# Patient Record
Sex: FEMALE | Race: WHITE | NOT HISPANIC OR LATINO | Employment: FULL TIME | URBAN - METROPOLITAN AREA
[De-identification: names, ages, dates, MRNs, and addresses within clinical notes are randomized per-mention and may not be internally consistent; named-entity substitution may affect disease eponyms.]

---

## 2018-06-29 ENCOUNTER — OFFICE VISIT (OUTPATIENT)
Dept: GASTROENTEROLOGY | Facility: CLINIC | Age: 43
End: 2018-06-29

## 2018-06-29 ENCOUNTER — LAB (OUTPATIENT)
Dept: LAB | Facility: HOSPITAL | Age: 43
End: 2018-06-29

## 2018-06-29 VITALS
HEART RATE: 71 BPM | OXYGEN SATURATION: 98 % | TEMPERATURE: 98.3 F | HEIGHT: 63 IN | SYSTOLIC BLOOD PRESSURE: 120 MMHG | BODY MASS INDEX: 23.18 KG/M2 | WEIGHT: 130.8 LBS | DIASTOLIC BLOOD PRESSURE: 80 MMHG

## 2018-06-29 DIAGNOSIS — R79.89 LFT ELEVATION: Primary | ICD-10-CM

## 2018-06-29 DIAGNOSIS — R79.89 LFT ELEVATION: ICD-10-CM

## 2018-06-29 LAB
ALBUMIN SERPL-MCNC: 4.53 G/DL (ref 3.2–4.8)
ALBUMIN/GLOB SERPL: 1.5 G/DL (ref 1.5–2.5)
ALP SERPL-CCNC: 82 U/L (ref 25–100)
ALT SERPL W P-5'-P-CCNC: 24 U/L (ref 7–40)
ANION GAP SERPL CALCULATED.3IONS-SCNC: 6 MMOL/L (ref 3–11)
AST SERPL-CCNC: 26 U/L (ref 0–33)
BILIRUB SERPL-MCNC: 0.4 MG/DL (ref 0.3–1.2)
BUN BLD-MCNC: 8 MG/DL (ref 9–23)
BUN/CREAT SERPL: 12.9 (ref 7–25)
CALCIUM SPEC-SCNC: 9.3 MG/DL (ref 8.7–10.4)
CHLORIDE SERPL-SCNC: 105 MMOL/L (ref 99–109)
CO2 SERPL-SCNC: 28 MMOL/L (ref 20–31)
CREAT BLD-MCNC: 0.62 MG/DL (ref 0.6–1.3)
FERRITIN SERPL-MCNC: 58 NG/ML (ref 10–291)
GFR SERPL CREATININE-BSD FRML MDRD: 106 ML/MIN/1.73
GLOBULIN UR ELPH-MCNC: 3.1 GM/DL
GLUCOSE BLD-MCNC: 93 MG/DL (ref 70–100)
HBV SURFACE AB SER RIA-ACNC: NORMAL
IRON 24H UR-MRATE: 72 MCG/DL (ref 50–175)
IRON SATN MFR SERPL: 21 % (ref 15–50)
POTASSIUM BLD-SCNC: 3.8 MMOL/L (ref 3.5–5.5)
PROT SERPL-MCNC: 7.6 G/DL (ref 5.7–8.2)
SODIUM BLD-SCNC: 139 MMOL/L (ref 132–146)
TIBC SERPL-MCNC: 341 MCG/DL (ref 250–450)

## 2018-06-29 PROCEDURE — 83516 IMMUNOASSAY NONANTIBODY: CPT

## 2018-06-29 PROCEDURE — 86376 MICROSOMAL ANTIBODY EACH: CPT

## 2018-06-29 PROCEDURE — 82728 ASSAY OF FERRITIN: CPT

## 2018-06-29 PROCEDURE — 36415 COLL VENOUS BLD VENIPUNCTURE: CPT

## 2018-06-29 PROCEDURE — 86706 HEP B SURFACE ANTIBODY: CPT

## 2018-06-29 PROCEDURE — 86255 FLUORESCENT ANTIBODY SCREEN: CPT

## 2018-06-29 PROCEDURE — 82784 ASSAY IGA/IGD/IGG/IGM EACH: CPT

## 2018-06-29 PROCEDURE — 83540 ASSAY OF IRON: CPT

## 2018-06-29 PROCEDURE — 99244 OFF/OP CNSLTJ NEW/EST MOD 40: CPT | Performed by: NURSE PRACTITIONER

## 2018-06-29 PROCEDURE — 86708 HEPATITIS A ANTIBODY: CPT

## 2018-06-29 PROCEDURE — 86038 ANTINUCLEAR ANTIBODIES: CPT

## 2018-06-29 PROCEDURE — 83550 IRON BINDING TEST: CPT

## 2018-06-29 PROCEDURE — 80053 COMPREHEN METABOLIC PANEL: CPT

## 2018-06-29 RX ORDER — CITALOPRAM 20 MG/1
20 TABLET ORAL DAILY
COMMUNITY
Start: 2018-04-30

## 2018-06-30 LAB
IGA SERPL-MCNC: <5 MG/DL (ref 87–352)
IGG SERPL-MCNC: 1688 MG/DL (ref 700–1600)
IGM SERPL-MCNC: 127 MG/DL (ref 26–217)

## 2018-07-01 LAB — HAV AB SER QL IA: NEGATIVE

## 2018-07-02 LAB
ACTIN IGG SERPL-ACNC: 20 UNITS (ref 0–19)
ALP LIVER CFR SERPL: 1.2 UNITS (ref 0–20)
ANA SER QL IA: POSITIVE
DEPRECATED MITOCHONDRIA M2 IGG SER-ACNC: <20 UNITS (ref 0–20)
ENDOMYSIUM IGA SER QL: NEGATIVE
IGA SERPL-MCNC: <5 MG/DL (ref 87–352)
Lab: ABNORMAL
NUCLEAR MEMBRANE PATTERN: ABNORMAL
TTG IGA SER-ACNC: <2 U/ML (ref 0–3)
TTG IGG SER-ACNC: >100 U/ML (ref 0–5)

## 2018-07-03 LAB — SOLUBLE LIVER IGG SER IA-ACNC: 3.3 UNITS (ref 0–20)

## 2018-07-06 ENCOUNTER — HOSPITAL ENCOUNTER (OUTPATIENT)
Dept: ULTRASOUND IMAGING | Facility: HOSPITAL | Age: 43
Discharge: HOME | End: 2018-07-06
Admitting: NURSE PRACTITIONER

## 2018-07-06 DIAGNOSIS — R79.89 LFT ELEVATION: ICD-10-CM

## 2018-07-06 PROCEDURE — 76705 ECHO EXAM OF ABDOMEN: CPT

## 2018-07-11 ENCOUNTER — TELEPHONE (OUTPATIENT)
Dept: GASTROENTEROLOGY | Facility: CLINIC | Age: 43
End: 2018-07-11

## 2018-07-14 NOTE — TELEPHONE ENCOUNTER
Discussed lab results.  I recommend liver bx d/t inconclusive results of liver blood work up.    Pt will think about it and get back with me.        Liver Profile     Lab Results   Component Value Date    AST 26 06/29/2018     Lab Results   Component Value Date    ALT 24 06/29/2018     Lab Results   Component Value Date    ALKPHOS 82 06/29/2018     Lab Results   Component Value Date    BILITOT 0.4 06/29/2018    ALBUMIN 4.53 06/29/2018    PROTEINTOT 7.6 06/29/2018     Albumin Globulin Ratio (Normal 1.5 - 2.5 g/dL)  Lab Results   Component Value Date    LABIL2 1.5 06/29/2018      Alkaline Phosphatase, Isoenzymes   Lab Results   Component Value Date    LABLIVE 1.2 06/29/2018     Immunity against Hep A and B  Lab Results   Component Value Date    HAV Negative 06/29/2018    HEPBSAB Non-Reactive 06/29/2018     TIBC (Normal 250 - 450 ug/dL)  Lab Results   Component Value Date    TIBC 341 06/29/2018     Serum Iron (Normal 38 - 169 ug/dL)  Lab Results   Component Value Date    IRON 72 06/29/2018     Transferrin or Iron Saturation % (Normal 20 - 50 %)  Lab Results   Component Value Date    LABIRON 21 06/29/2018     Ferritin (Normal 20.00 - 291.00 ng/mL)  Lab Results   Component Value Date    FERRITIN 58.00 06/29/2018     Thyroid/Calcium Panel (Normal TSH: 0.350 - 5.350 mIU/mL)  Lab Results   Component Value Date    CALCIUM 9.3 06/29/2018     Autoimmune Markers  Lab Results   Component Value Date    ZAINA Positive (A) 06/29/2018    LABANTI 3.3 06/29/2018     Rheumatoid Factor  No results found for: RALATEXTURBD  Immunoglobulins Panel [IgG (IgG Subclasses), IgA, IgM]  (IgG 700 - 1600 mg/dL. IgA 91 - 414 mg/dL. IgM 40 - 230 mg/dL)  Lab Results   Component Value Date    TOTIGGREF 1688 (H) 06/29/2018    IGA <5 (L) 06/29/2018    IGA <5 (L) 06/29/2018     06/29/2018     Celiac Panel (IgA 91 - 414 mg/dL)  Lab Results   Component Value Date    TTRANSGLIGA <2 06/29/2018    TSUTRANIGG >100 (H) 06/29/2018    [id number] Negative  06/29/2018    IGA <5 (L) 06/29/2018    IGA <5 (L) 06/29/2018     IBD Panel  No results found for: ATYPPANC, SCERVIGG, SCERVIGA, VERNON, ACCA, ALCA, AMCA, MYELOP, PR3ABS, CANCA, PANCA  Anti-Mitochondrial Antibody (AMA)   Lab Results   Component Value Date    MITOAB <20.0 06/29/2018     Anti-Smooth Muscle Antibody   Lab Results   Component Value Date    SMOOTHMUSCAB 20 (H) 06/29/2018     Liver-Kidney Antibody (Anti-microsomal Antibody)  Lab Results   Component Value Date    LABLIVE 1.2 06/29/2018     Alpha-1 Antitrypsin Phenotype and Alpha Tumor Marker  No results found for: AFPTM, PHENOTYPE  Ceruloplasmin (Normal 16.0 - 31.0 mg/dL)  No results found for: CERULOPLSM  Copper  No results found for: COPPER  Soluble Liver Ag (IgG Ab)   Lab Results   Component Value Date    LABANTI 3.3 06/29/2018

## 2018-07-17 ENCOUNTER — TELEPHONE (OUTPATIENT)
Dept: GASTROENTEROLOGY | Facility: CLINIC | Age: 43
End: 2018-07-17

## 2018-07-17 DIAGNOSIS — R74.8 LIVER ENZYME ELEVATION: Primary | ICD-10-CM

## 2018-07-17 DIAGNOSIS — R76.0 ANTINUCLEAR ANTIBODY (ANA) TITER GREATER THAN 1:80: ICD-10-CM

## 2018-08-03 ENCOUNTER — HOSPITAL ENCOUNTER (OUTPATIENT)
Dept: CT IMAGING | Facility: HOSPITAL | Age: 43
Discharge: HOME | End: 2018-08-03
Admitting: NURSE PRACTITIONER

## 2018-08-03 VITALS
HEIGHT: 63 IN | RESPIRATION RATE: 18 BRPM | BODY MASS INDEX: 23.21 KG/M2 | TEMPERATURE: 98.6 F | HEART RATE: 69 BPM | SYSTOLIC BLOOD PRESSURE: 102 MMHG | DIASTOLIC BLOOD PRESSURE: 67 MMHG | OXYGEN SATURATION: 98 % | WEIGHT: 131 LBS

## 2018-08-03 DIAGNOSIS — R76.0 ANTINUCLEAR ANTIBODY (ANA) TITER GREATER THAN 1:80: ICD-10-CM

## 2018-08-03 DIAGNOSIS — R74.8 LIVER ENZYME ELEVATION: ICD-10-CM

## 2018-08-03 LAB
APTT PPP: 25.1 SECONDS (ref 24–31)
B-HCG UR QL: NEGATIVE
INR PPP: 0.94 (ref 0.91–1.09)
PLATELET # BLD AUTO: 185 10*3/MM3 (ref 150–450)
PROTHROMBIN TIME: 9.9 SECONDS (ref 9.6–11.5)

## 2018-08-03 PROCEDURE — 77012 CT SCAN FOR NEEDLE BIOPSY: CPT

## 2018-08-03 PROCEDURE — 85610 PROTHROMBIN TIME: CPT | Performed by: RADIOLOGY

## 2018-08-03 PROCEDURE — 85730 THROMBOPLASTIN TIME PARTIAL: CPT | Performed by: RADIOLOGY

## 2018-08-03 PROCEDURE — 85049 AUTOMATED PLATELET COUNT: CPT | Performed by: RADIOLOGY

## 2018-08-03 PROCEDURE — 88313 SPECIAL STAINS GROUP 2: CPT | Performed by: NURSE PRACTITIONER

## 2018-08-03 PROCEDURE — 81025 URINE PREGNANCY TEST: CPT | Performed by: RADIOLOGY

## 2018-08-03 PROCEDURE — 25010000002 FENTANYL CITRATE (PF) 100 MCG/2ML SOLUTION

## 2018-08-03 PROCEDURE — 88307 TISSUE EXAM BY PATHOLOGIST: CPT | Performed by: NURSE PRACTITIONER

## 2018-08-03 RX ORDER — FENTANYL CITRATE 50 UG/ML
INJECTION, SOLUTION INTRAMUSCULAR; INTRAVENOUS
Status: COMPLETED
Start: 2018-08-03 | End: 2018-08-03

## 2018-08-03 RX ORDER — FENTANYL CITRATE 50 UG/ML
50 INJECTION, SOLUTION INTRAMUSCULAR; INTRAVENOUS
Status: CANCELLED | OUTPATIENT
Start: 2018-08-03

## 2018-08-03 RX ORDER — SODIUM CHLORIDE 0.9 % (FLUSH) 0.9 %
1-10 SYRINGE (ML) INJECTION AS NEEDED
Status: DISCONTINUED | OUTPATIENT
Start: 2018-08-03 | End: 2018-08-04 | Stop reason: HOSPADM

## 2018-08-03 RX ADMIN — FENTANYL CITRATE 50 MCG: 50 INJECTION, SOLUTION INTRAMUSCULAR; INTRAVENOUS at 10:23

## 2018-08-03 NOTE — NURSING NOTE
Pt discharged to home s/p liver biopsy.  Pt tolerated procedure without complications.  Discharge instructions reviewed with patient who voiced her understanding.  Transported to exit via wheelchair per CNA.

## 2018-08-03 NOTE — NURSING NOTE
Procedure complete.  Pt tolerated well.  Report called to [facility].  Pt returned to room via hospital transport.

## 2018-08-03 NOTE — PROCEDURES
Radiology Procedure    Pre-procedure: Elevated LFTs     Procedure Performed: CT-guided liver biopsy     IV Sedation and/or Anesthesia:  No    Complications: None    Preliminary Findings: Homogenous liver parenchyma, No lesion    Specimen Removed: Core biopsy x 3    Estimated Blood Loss:  0ml    Post-Procedure Diagnosis: Elevated LFTs for random liver biopsy    Post-Procedure Plan: Await full pathology report    Standard Discharge Instructions Given:yes     Jimenez Robison DO  08/03/18  12:01 PM

## 2018-08-06 ENCOUNTER — TELEPHONE (OUTPATIENT)
Dept: INFUSION THERAPY | Facility: HOSPITAL | Age: 43
End: 2018-08-06

## 2018-08-06 LAB
CYTO UR: NORMAL
LAB AP CASE REPORT: NORMAL
LAB AP CLINICAL INFORMATION: NORMAL
LAB AP DIAGNOSIS COMMENT: NORMAL
PATH REPORT.FINAL DX SPEC: NORMAL
PATH REPORT.GROSS SPEC: NORMAL

## 2018-08-08 ENCOUNTER — TELEPHONE (OUTPATIENT)
Dept: GASTROENTEROLOGY | Facility: CLINIC | Age: 43
End: 2018-08-08

## 2018-08-09 ENCOUNTER — TELEPHONE (OUTPATIENT)
Dept: GASTROENTEROLOGY | Facility: CLINIC | Age: 43
End: 2018-08-09

## 2018-09-21 ENCOUNTER — OUTSIDE FACILITY SERVICE (OUTPATIENT)
Dept: GASTROENTEROLOGY | Facility: CLINIC | Age: 43
End: 2018-09-21

## 2018-09-21 ENCOUNTER — LAB REQUISITION (OUTPATIENT)
Dept: LAB | Facility: HOSPITAL | Age: 43
End: 2018-09-21

## 2018-09-21 DIAGNOSIS — K90.0 CELIAC DISEASE: ICD-10-CM

## 2018-09-21 DIAGNOSIS — K30 FUNCTIONAL DYSPEPSIA: ICD-10-CM

## 2018-09-21 PROCEDURE — 43239 EGD BIOPSY SINGLE/MULTIPLE: CPT | Performed by: INTERNAL MEDICINE

## 2018-09-21 PROCEDURE — 88305 TISSUE EXAM BY PATHOLOGIST: CPT | Performed by: INTERNAL MEDICINE

## 2018-09-25 ENCOUNTER — TELEPHONE (OUTPATIENT)
Dept: GASTROENTEROLOGY | Facility: CLINIC | Age: 43
End: 2018-09-25

## 2018-09-25 NOTE — TELEPHONE ENCOUNTER
I called and left a message regarding the pathology.  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  I did recommend the patient begin a gluten-free diet.  Also left a message regarding a good website which is the celiac sprue association.

## 2018-09-26 NOTE — TELEPHONE ENCOUNTER
Gave patient your message, she would like to know what the extend of the damage was? she would also like to know if she needs to follow up?

## 2018-09-28 NOTE — TELEPHONE ENCOUNTER
Gave patient the message, she was transferred to [facility] to make 2 month follow up.

## 2018-12-05 ENCOUNTER — OFFICE VISIT (OUTPATIENT)
Dept: GASTROENTEROLOGY | Facility: CLINIC | Age: 43
End: 2018-12-05

## 2018-12-05 ENCOUNTER — APPOINTMENT (OUTPATIENT)
Dept: LAB | Facility: HOSPITAL | Age: 43
End: 2018-12-05

## 2018-12-05 VITALS
SYSTOLIC BLOOD PRESSURE: 110 MMHG | WEIGHT: 134.4 LBS | OXYGEN SATURATION: 97 % | BODY MASS INDEX: 23.81 KG/M2 | HEART RATE: 72 BPM | TEMPERATURE: 98.2 F | HEIGHT: 63 IN | DIASTOLIC BLOOD PRESSURE: 68 MMHG

## 2018-12-05 DIAGNOSIS — K90.0 CELIAC DISEASE: Primary | ICD-10-CM

## 2018-12-05 LAB — 25(OH)D3 SERPL-MCNC: 18.8 NG/ML

## 2018-12-05 PROCEDURE — 36415 COLL VENOUS BLD VENIPUNCTURE: CPT | Performed by: INTERNAL MEDICINE

## 2018-12-05 PROCEDURE — 82306 VITAMIN D 25 HYDROXY: CPT | Performed by: INTERNAL MEDICINE

## 2018-12-05 PROCEDURE — 99214 OFFICE O/P EST MOD 30 MIN: CPT | Performed by: INTERNAL MEDICINE

## 2018-12-05 NOTE — PROGRESS NOTES
PCP: Marva Crocker MD    Chief Complaint   Patient presents with   • Follow-up       History of Present Illness:   HPI  Mrs. Puentes returns to the office for follow-up of celiac disease.  She is doing well on a gluten-free diet.  Her bowel habits are normal without issues with constipation.  The patient actually has more frequent bowel movements about 4 times a week.  She also has noticed less fatigue.  The patient has never really experienced any renee bloating.  She denies any nausea or vomiting.  Past Medical History:   Diagnosis Date   • Celiac disease    • Disease of thyroid gland    • Elevated liver enzymes    • Irritant contact dermatitis    • Positive ZAINA (antinuclear antibody)    • Sarcoidosis    • Telangiectasia        Past Surgical History:   Procedure Laterality Date   • BILATERAL BREAST REDUCTION     • CHOLECYSTECTOMY     • UPPER GASTROINTESTINAL ENDOSCOPY           Current Outpatient Medications:   •  citalopram (CeleXA) 20 MG tablet, Take 20 mg by mouth Daily., Disp: , Rfl:   •  Levothyroxine Sodium (SYNTHROID PO), Take 88 mcg by mouth Daily., Disp: , Rfl:     No Known Allergies    Family History   Family history unknown: Yes       Social History     Socioeconomic History   • Marital status:      Spouse name: Not on file   • Number of children: Not on file   • Years of education: Not on file   • Highest education level: Not on file   Social Needs   • Financial resource strain: Not on file   • Food insecurity - worry: Not on file   • Food insecurity - inability: Not on file   • Transportation needs - medical: Not on file   • Transportation needs - non-medical: Not on file   Occupational History   • Not on file   Tobacco Use   • Smoking status: Never Smoker   Substance and Sexual Activity   • Alcohol use: No   • Drug use: No   • Sexual activity: Not on file     Comment: mirena   Other Topics Concern   • Not on file   Social History Narrative   • Not on file       Review of Systems    Constitutional: Negative for activity change, appetite change, fatigue, fever and unexpected weight change.   HENT: Negative for dental problem, hearing loss, mouth sores, postnasal drip, sneezing, trouble swallowing and voice change.    Eyes: Negative for pain, redness, itching and visual disturbance.   Respiratory: Negative for cough, choking, chest tightness, shortness of breath and wheezing.    Cardiovascular: Negative for chest pain, palpitations and leg swelling.   Gastrointestinal: Negative for abdominal distention, abdominal pain, anal bleeding, blood in stool, constipation, diarrhea, nausea, rectal pain and vomiting.   Endocrine: Negative for cold intolerance, heat intolerance, polydipsia, polyphagia and polyuria.   Genitourinary: Negative.  Negative for dysuria, enuresis, flank pain, hematuria and urgency.   Musculoskeletal: Negative for arthralgias, back pain, gait problem, joint swelling and myalgias.   Skin: Negative for color change, pallor and rash.   Allergic/Immunologic: Negative for environmental allergies, food allergies and immunocompromised state.   Neurological: Negative for dizziness, tremors, seizures, facial asymmetry, speech difficulty, numbness and headaches.   Hematological: Negative for adenopathy.   Psychiatric/Behavioral: Negative for behavioral problems, confusion, dysphoric mood, hallucinations and self-injury.       Vitals:    12/05/18 1442   BP: 110/68   Pulse: 72   Temp: 98.2 °F (36.8 °C)   SpO2: 97%       Physical Exam   Constitutional: She is oriented to person, place, and time. She appears well-nourished. No distress.   HENT:   Head: Atraumatic.   Mouth/Throat: Oropharynx is clear and moist. No oropharyngeal exudate.   Eyes: EOM are normal. No scleral icterus.   Neck: Neck supple. No thyromegaly present.   Cardiovascular: Normal rate, regular rhythm and normal heart sounds. Exam reveals no gallop.   No murmur heard.  Pulmonary/Chest: Effort normal and breath sounds normal.  She has no wheezes. She has no rales.   Abdominal: Soft. Bowel sounds are normal. There is no tenderness. There is no rebound and no guarding.   Musculoskeletal: Normal range of motion. She exhibits no edema or tenderness.   Lymphadenopathy:     She has no cervical adenopathy.   Neurological: She is alert and oriented to person, place, and time. She exhibits normal muscle tone.   Skin: Skin is dry. No rash noted. No erythema.   Psychiatric: She has a normal mood and affect. Her behavior is normal. Thought content normal.   Vitals reviewed.      Yary was seen today for follow-up.    Diagnoses and all orders for this visit:    Celiac disease  -     Vitamin D 25 Hydroxy  -     Ambulatory Referral to Nutrition Services  -     dexa bone density axial; Future    The patient is doing well on the gluten-free diet.  The most significant change has been regular bowel habits and no issue with constipation.  Discussed the other effects of celiac disease including bone density loss and vitamin D deficiency.      Plan: Will check vitamin D level.           Will send the patient to our nutrition services for continued information on celiac disease nutrition.           Will obtain baseline bone density study.           Follow up in 5 months.    I spent over 50% of the office visit counseling and answering questions from the patient.

## 2018-12-06 ENCOUNTER — APPOINTMENT (OUTPATIENT)
Dept: BONE DENSITY | Facility: HOSPITAL | Age: 43
End: 2018-12-06
Attending: INTERNAL MEDICINE

## 2018-12-06 ENCOUNTER — PATIENT MESSAGE (OUTPATIENT)
Dept: GASTROENTEROLOGY | Facility: CLINIC | Age: 43
End: 2018-12-06

## 2018-12-06 ENCOUNTER — TELEPHONE (OUTPATIENT)
Dept: GASTROENTEROLOGY | Facility: CLINIC | Age: 43
End: 2018-12-06

## 2018-12-06 NOTE — TELEPHONE ENCOUNTER
----- Message from Russel Eller MD sent at 12/5/2018  6:49 PM EST -----  Let Ms. Puentes know the vitamin D level is low at 19.  She should take vitamin D3 2000 international units daily.

## 2018-12-07 NOTE — TELEPHONE ENCOUNTER
From: Yary Puentes  To: Russel Eller MD  Sent: 12/6/2018 8:26 AM EST  Subject: Referral Request    I am supposed to get a bone density test. Is it possible to get this done at a location other than [facility] and have the results sent to your office? If so could I please have the order for the test? Thank you.

## 2018-12-20 ENCOUNTER — HOSPITAL ENCOUNTER (OUTPATIENT)
Dept: NUTRITION | Facility: HOSPITAL | Age: 43
Setting detail: RECURRING SERIES
Discharge: HOME | End: 2018-12-20

## 2018-12-20 PROCEDURE — 97802 MEDICAL NUTRITION INDIV IN: CPT | Performed by: DIETITIAN, REGISTERED

## 2018-12-21 VITALS — WEIGHT: 130 LBS | BODY MASS INDEX: 23.04 KG/M2 | HEIGHT: 63 IN

## 2018-12-31 ENCOUNTER — TELEPHONE (OUTPATIENT)
Dept: GASTROENTEROLOGY | Facility: CLINIC | Age: 43
End: 2018-12-31

## 2018-12-31 NOTE — TELEPHONE ENCOUNTER
----- Message from Russel Eller MD sent at 12/30/2018  1:25 PM EST -----  Please let Yary know that the bone density test was normal.  This is great.  Thank you,  EUGENIE

## 2019-01-14 ENCOUNTER — TELEPHONE (OUTPATIENT)
Dept: NUTRITION | Facility: HOSPITAL | Age: 44
End: 2019-01-14

## 2019-01-14 NOTE — PROGRESS NOTES
"RD called and spoke with patient for 1 month telephone follow up regarding celiac disease. Patient states she is doing \"really well\" with following a GF diet and that the products and recipe options provided during the initial session were very helpful. Patient reports she has been able to transition her kitchen to prevent cross contamination and states she has her own set of kitchen equipment and keeps it separate from her husbands. She denies any problems or concerns with GF diet at this time. Patient states as far as weight loss she does not know if she has lost any weight due to the holidays and extra GF sweets, but states she is getting back on a more strict routine. Reports overall that adding in more protein and fruit at breakfast and lunch has helped decrease hunger and sweet cravings in the evening. Patient has no additional questions at this time. RD encouraged patient to continue with the diet changes and progress she has made so far.     Goal completion:  1. Follow GF diet: 100%  2. Maintain or lose weight 0.9kg/month: 100% self reported maintain    Total of 10 minutes spent for telephone follow up. Patient has RD contact information and is encouraged to call as needed. Thank you again for this referral.       "

## 2019-05-08 ENCOUNTER — OFFICE VISIT (OUTPATIENT)
Dept: GASTROENTEROLOGY | Facility: CLINIC | Age: 44
End: 2019-05-08

## 2019-05-08 VITALS
OXYGEN SATURATION: 99 % | WEIGHT: 136.8 LBS | DIASTOLIC BLOOD PRESSURE: 60 MMHG | HEART RATE: 67 BPM | HEIGHT: 63 IN | SYSTOLIC BLOOD PRESSURE: 98 MMHG | BODY MASS INDEX: 24.24 KG/M2 | TEMPERATURE: 98.7 F

## 2019-05-08 DIAGNOSIS — K90.0 CELIAC SPRUE: Primary | ICD-10-CM

## 2019-05-08 PROCEDURE — 99214 OFFICE O/P EST MOD 30 MIN: CPT | Performed by: INTERNAL MEDICINE

## 2019-05-08 NOTE — PROGRESS NOTES
PCP: Marva Crocker MD    Chief Complaint   Patient presents with   • Follow-up     CELIAC DISEASE       History of Present Illness:   HPI   Mrs. Puentes returns for a follow-up visit.  The patient is doing well at this time without abdominal pain.  She denies any bloating with meals.  There is no history of nausea or vomiting.  The patient may have some constipation at time but denies any renee diarrhea.  There is no history of unexplained weight loss.  Mrs. Puentes denies any breakthrough heartburn.  She is taking vitamin D on a regular basis.  There is no history of renee blood in the stool.  Past Medical History:   Diagnosis Date   • Celiac disease    • Disease of thyroid gland    • Elevated liver enzymes    • Irritant contact dermatitis    • Positive ZAINA (antinuclear antibody)    • Sarcoidosis    • Telangiectasia        Past Surgical History:   Procedure Laterality Date   • BILATERAL BREAST REDUCTION     • CHOLECYSTECTOMY     • UPPER GASTROINTESTINAL ENDOSCOPY           Current Outpatient Medications:   •  Cholecalciferol (VITAMIN D PO), Take  by mouth Daily., Disp: , Rfl:   •  citalopram (CeleXA) 20 MG tablet, Take 20 mg by mouth Daily., Disp: , Rfl:   •  Levothyroxine Sodium (SYNTHROID PO), Take 88 mcg by mouth Daily., Disp: , Rfl:     No Known Allergies    Family History   Family history unknown: Yes       Social History     Socioeconomic History   • Marital status:      Spouse name: Not on file   • Number of children: Not on file   • Years of education: Not on file   • Highest education level: Not on file   Tobacco Use   • Smoking status: Never Smoker   Substance and Sexual Activity   • Alcohol use: No   • Drug use: No       Review of Systems   Constitutional: Negative for activity change, appetite change, fatigue, fever and unexpected weight change.   HENT: Negative for dental problem, hearing loss, mouth sores, postnasal drip, sneezing, trouble swallowing and voice change.    Eyes: Negative  for pain, redness, itching and visual disturbance.   Respiratory: Negative for cough, choking, chest tightness, shortness of breath and wheezing.    Cardiovascular: Negative for chest pain, palpitations and leg swelling.   Gastrointestinal: Negative for abdominal distention, abdominal pain, anal bleeding, blood in stool, constipation, diarrhea, nausea, rectal pain and vomiting.   Endocrine: Negative for cold intolerance, heat intolerance, polydipsia, polyphagia and polyuria.   Genitourinary: Negative.  Negative for dysuria, enuresis, flank pain, hematuria and urgency.   Musculoskeletal: Negative for arthralgias, back pain, gait problem, joint swelling and myalgias.   Skin: Negative for color change, pallor and rash.   Allergic/Immunologic: Negative for environmental allergies, food allergies and immunocompromised state.   Neurological: Negative for dizziness, tremors, seizures, facial asymmetry, speech difficulty, numbness and headaches.   Hematological: Negative for adenopathy.   Psychiatric/Behavioral: Negative for behavioral problems, confusion, dysphoric mood, hallucinations and self-injury.       Vitals:    05/08/19 1524   BP: 98/60   Pulse: 67   Temp: 98.7 °F (37.1 °C)   SpO2: 99%       Physical Exam   Constitutional: She is oriented to person, place, and time. She appears well-nourished. No distress.   HENT:   Head: Atraumatic.   Mouth/Throat: Oropharynx is clear and moist. No oropharyngeal exudate.   Eyes: EOM are normal. No scleral icterus.   Neck: Neck supple. No thyromegaly present.   Cardiovascular: Normal rate, regular rhythm and normal heart sounds. Exam reveals no gallop.   No murmur heard.  Pulmonary/Chest: Effort normal and breath sounds normal. She has no wheezes. She has no rales.   Abdominal: Soft. Bowel sounds are normal. There is no tenderness. There is no rebound and no guarding.   Musculoskeletal: Normal range of motion. She exhibits no edema or tenderness.   Lymphadenopathy:     She has no  cervical adenopathy.   Neurological: She is alert and oriented to person, place, and time. She exhibits normal muscle tone.   Skin: Skin is dry. No rash noted. No erythema.   Psychiatric: She has a normal mood and affect. Her behavior is normal. Thought content normal.   Vitals reviewed.      Yary was seen today for follow-up.    Diagnoses and all orders for this visit:    Celiac sprue  -     Vitamin D 25 Hydroxy    The patient has biopsy-proven celiac sprue.  She  is doing well with the gluten-free diet.  The patient did receive some helpful instruction from the dietary nutrition specialist.  She does have less fatigue and overall feels better.  The patient had a normal DEXA scan.      Plan: Will check vitamin D level.           Continue gluten-free diet.           Will follow-up in the office in 1 year.

## 2019-05-13 ENCOUNTER — TELEPHONE (OUTPATIENT)
Dept: GASTROENTEROLOGY | Facility: CLINIC | Age: 44
End: 2019-05-13

## 2019-05-13 NOTE — TELEPHONE ENCOUNTER
PATIENT CALLED BACK. REMINDED HER TO GET VITAMIN D LAB DRAWN. PATIENT STATED SHE DIDN'T KNOW ABOUT THE VITAMIN D ORDERED BUT SHE WILL GET IT DONE.

## 2019-05-16 ENCOUNTER — APPOINTMENT (OUTPATIENT)
Dept: LAB | Facility: HOSPITAL | Age: 44
End: 2019-05-16

## 2019-05-16 LAB — 25(OH)D3 SERPL-MCNC: 20.6 NG/ML (ref 30–100)

## 2019-05-16 PROCEDURE — 82306 VITAMIN D 25 HYDROXY: CPT | Performed by: INTERNAL MEDICINE

## 2019-05-16 PROCEDURE — 36415 COLL VENOUS BLD VENIPUNCTURE: CPT | Performed by: INTERNAL MEDICINE

## 2019-05-23 ENCOUNTER — TELEPHONE (OUTPATIENT)
Dept: GASTROENTEROLOGY | Facility: CLINIC | Age: 44
End: 2019-05-23

## 2019-05-23 NOTE — TELEPHONE ENCOUNTER
I called and discussed the labs with Ms. Puentes. She will take an extra vitamin D3 several days a week.

## 2021-06-03 ENCOUNTER — LAB (OUTPATIENT)
Dept: LAB | Facility: HOSPITAL | Age: 46
End: 2021-06-03

## 2021-06-03 ENCOUNTER — OFFICE VISIT (OUTPATIENT)
Dept: GASTROENTEROLOGY | Facility: CLINIC | Age: 46
End: 2021-06-03

## 2021-06-03 VITALS
BODY MASS INDEX: 24.95 KG/M2 | HEART RATE: 63 BPM | WEIGHT: 140.8 LBS | DIASTOLIC BLOOD PRESSURE: 76 MMHG | TEMPERATURE: 97.5 F | SYSTOLIC BLOOD PRESSURE: 122 MMHG | HEIGHT: 63 IN | OXYGEN SATURATION: 98 %

## 2021-06-03 DIAGNOSIS — R10.84 GENERALIZED ABDOMINAL PAIN: ICD-10-CM

## 2021-06-03 DIAGNOSIS — R19.4 CHANGE IN BOWEL HABITS: ICD-10-CM

## 2021-06-03 DIAGNOSIS — K59.00 CONSTIPATION, UNSPECIFIED CONSTIPATION TYPE: ICD-10-CM

## 2021-06-03 DIAGNOSIS — K59.00 CONSTIPATION, UNSPECIFIED CONSTIPATION TYPE: Primary | ICD-10-CM

## 2021-06-03 LAB
BASOPHILS # BLD AUTO: 0.06 10*3/MM3 (ref 0–0.2)
BASOPHILS NFR BLD AUTO: 0.9 % (ref 0–1.5)
DEPRECATED RDW RBC AUTO: 37.4 FL (ref 37–54)
EOSINOPHIL # BLD AUTO: 0.39 10*3/MM3 (ref 0–0.4)
EOSINOPHIL NFR BLD AUTO: 5.9 % (ref 0.3–6.2)
ERYTHROCYTE [DISTWIDTH] IN BLOOD BY AUTOMATED COUNT: 12.3 % (ref 12.3–15.4)
HCT VFR BLD AUTO: 38.6 % (ref 34–46.6)
HGB BLD-MCNC: 12.8 G/DL (ref 12–15.9)
IMM GRANULOCYTES # BLD AUTO: 0.02 10*3/MM3 (ref 0–0.05)
IMM GRANULOCYTES NFR BLD AUTO: 0.3 % (ref 0–0.5)
LYMPHOCYTES # BLD AUTO: 1.95 10*3/MM3 (ref 0.7–3.1)
LYMPHOCYTES NFR BLD AUTO: 29.4 % (ref 19.6–45.3)
MCH RBC QN AUTO: 28.1 PG (ref 26.6–33)
MCHC RBC AUTO-ENTMCNC: 33.2 G/DL (ref 31.5–35.7)
MCV RBC AUTO: 84.6 FL (ref 79–97)
MONOCYTES # BLD AUTO: 0.37 10*3/MM3 (ref 0.1–0.9)
MONOCYTES NFR BLD AUTO: 5.6 % (ref 5–12)
NEUTROPHILS NFR BLD AUTO: 3.84 10*3/MM3 (ref 1.7–7)
NEUTROPHILS NFR BLD AUTO: 57.9 % (ref 42.7–76)
NRBC BLD AUTO-RTO: 0 /100 WBC (ref 0–0.2)
PLATELET # BLD AUTO: 229 10*3/MM3 (ref 140–450)
PMV BLD AUTO: 12.4 FL (ref 6–12)
RBC # BLD AUTO: 4.56 10*6/MM3 (ref 3.77–5.28)
WBC # BLD AUTO: 6.63 10*3/MM3 (ref 3.4–10.8)

## 2021-06-03 PROCEDURE — 36415 COLL VENOUS BLD VENIPUNCTURE: CPT

## 2021-06-03 PROCEDURE — 85025 COMPLETE CBC W/AUTO DIFF WBC: CPT

## 2021-06-03 PROCEDURE — 99214 OFFICE O/P EST MOD 30 MIN: CPT | Performed by: INTERNAL MEDICINE

## 2021-06-03 NOTE — PROGRESS NOTES
Patient Name: Yary Puentes  YOB: 1975   Medical Record number: [id number]     PCP: Marva Crocker MD    Chief Complaint   Patient presents with   • Constipation       History of Present Illness:   HPI  Mrs. Puentes presents to the office today for evaluation of constipation.  The patient states that around April she began experiencing a change in bowel habits with significant constipation.  The patient says the stool has been hard.  She actually had difficulty with evacuation and a sense of incomplete emptying.  She tried multiple medications including magnesium citrate, bisacodyl and Dulcolax suppositories.  Mrs. Puentes has noticed some blood with wiping but not in the stool.  There is a history of some generalized abdominal pain that described as cramping.  The patient generally would have 3 bowel movements a week.  The change again started in April.  There is no history of unexplained weight loss.  The patient denies any family history of colorectal cancer or polyps.  There is no family history of inflammatory bowel disease.  Mrs. Puentes denies night sweats, fever or chills.  She does have a history of celiac sprue which is well controlled on a gluten-free diet.  Past Medical History:   Diagnosis Date   • Celiac disease    • Disease of thyroid gland    • Elevated liver enzymes    • Irritant contact dermatitis    • Positive ZAINA (antinuclear antibody)    • Sarcoidosis    • Telangiectasia        Past Surgical History:   Procedure Laterality Date   • BILATERAL BREAST REDUCTION     • CHOLECYSTECTOMY     • UPPER GASTROINTESTINAL ENDOSCOPY           Current Outpatient Medications:   •  Cholecalciferol (VITAMIN D PO), Take  by mouth Daily., Disp: , Rfl:   •  citalopram (CeleXA) 20 MG tablet, Take 20 mg by mouth Daily., Disp: , Rfl:   •  Cyanocobalamin (B-12 COMPLIANCE INJECTION IJ), Inject  as directed Every 30 (Thirty) Days., Disp: , Rfl:   •  Levothyroxine Sodium (SYNTHROID PO), Take 88  mcg by mouth Daily., Disp: , Rfl:     No Known Allergies    Family History   Family history unknown: Yes       Social History     Socioeconomic History   • Marital status:      Spouse name: Not on file   • Number of children: Not on file   • Years of education: Not on file   • Highest education level: Not on file   Tobacco Use   • Smoking status: Never Smoker   Substance and Sexual Activity   • Alcohol use: No   • Drug use: No       Review of Systems   Constitutional: Negative for activity change, appetite change, fatigue, fever and unexpected weight change.   HENT: Negative for dental problem, hearing loss, mouth sores, postnasal drip, sneezing, trouble swallowing and voice change.    Eyes: Negative for pain, redness, itching and visual disturbance.   Respiratory: Negative for cough, choking, chest tightness, shortness of breath and wheezing.    Cardiovascular: Negative for chest pain, palpitations and leg swelling.   Gastrointestinal: Positive for abdominal distention, abdominal pain, constipation and rectal pain. Negative for anal bleeding (on toliet paper), blood in stool, diarrhea, nausea and vomiting.        Heartburn   Endocrine: Negative for cold intolerance, heat intolerance, polydipsia, polyphagia and polyuria.        Gets hot while trying to have bowel movement.   Genitourinary: Negative.  Negative for dysuria, enuresis, flank pain, hematuria and urgency.   Musculoskeletal: Negative for arthralgias, back pain, gait problem, joint swelling and myalgias.   Skin: Negative for color change, pallor and rash.   Allergic/Immunologic: Negative for environmental allergies, food allergies and immunocompromised state.   Neurological: Negative for dizziness, tremors, seizures, facial asymmetry, speech difficulty, numbness and headaches.   Hematological: Negative for adenopathy.   Psychiatric/Behavioral: Negative for behavioral problems, confusion, dysphoric mood, hallucinations and self-injury.       Vitals:     06/03/21 1511   BP: 122/76   Pulse: 63   Temp: 97.5 °F (36.4 °C)   SpO2: 98%       Physical Exam  Vitals reviewed.   Constitutional:       General: She is not in acute distress.     Appearance: Normal appearance.   HENT:      Head: Normocephalic and atraumatic.      Nose: Nose normal.      Mouth/Throat:      Mouth: Mucous membranes are moist.      Pharynx: Oropharynx is clear.   Eyes:      General: No scleral icterus.     Extraocular Movements: Extraocular movements intact.   Cardiovascular:      Rate and Rhythm: Normal rate and regular rhythm.      Pulses: Normal pulses.      Heart sounds: No murmur heard.   No gallop.    Pulmonary:      Effort: Pulmonary effort is normal.      Breath sounds: Normal breath sounds. No wheezing or rales.   Abdominal:      General: Bowel sounds are normal.      Palpations: Abdomen is soft.      Tenderness: Tenderness: generalized. There is no guarding or rebound.   Musculoskeletal:         General: No swelling. Normal range of motion.      Cervical back: Normal range of motion. No rigidity.   Skin:     General: Skin is dry.      Coloration: Skin is not jaundiced.   Neurological:      Mental Status: She is alert and oriented to person, place, and time.   Psychiatric:         Mood and Affect: Mood normal.         Thought Content: Thought content normal.         Judgment: Judgment normal.         Diagnoses and all orders for this visit:    1. Constipation, unspecified constipation type (Primary)  -     CBC & Differential; Future  -     Colonoscopy; Future  -     Comprehensive Metabolic Panel; Future    2. Change in bowel habits  -     CBC & Differential; Future  -     Colonoscopy; Future  -     Comprehensive Metabolic Panel; Future    3. Generalized abdominal pain  -     CBC & Differential; Future  -     Colonoscopy; Future  -     Comprehensive Metabolic Panel; Future    The patient has experienced a change in bowel habits over the last 2 months.  The differential diagnosis includes  slow transit constipation and constipation predominant irritable bowel syndrome.  However, there is a need to exclude any luminal colorectal disease including polyps and malignancy.  There is association with celiac sprue and microscopic colitis.      Plan: Will check CBC and CMP.           Will schedule colonoscopy.

## 2021-06-07 ENCOUNTER — PATIENT MESSAGE (OUTPATIENT)
Dept: GASTROENTEROLOGY | Facility: CLINIC | Age: 46
End: 2021-06-07

## 2021-06-07 ENCOUNTER — TELEPHONE (OUTPATIENT)
Dept: GASTROENTEROLOGY | Facility: CLINIC | Age: 46
End: 2021-06-07

## 2021-06-07 NOTE — TELEPHONE ENCOUNTER
From: Yary Puentes  To: Russel Eller MD  Sent: 6/7/2021 11:00 AM EDT  Subject: Visit Follow-Up Question    I have taken the fourth doze of Linzess this morning. I'm wondering how long it will take to really begin working. I had some relief Saturday but not much Thank you.

## 2021-06-07 NOTE — TELEPHONE ENCOUNTER
----- Message from Russel Eller MD sent at 6/4/2021  3:45 PM EDT -----  Let Ms. Puentes know that the hemoglobin was normal.

## 2021-06-08 DIAGNOSIS — Z12.11 SCREENING FOR COLON CANCER: Primary | ICD-10-CM

## 2021-06-14 ENCOUNTER — OUTSIDE FACILITY SERVICE (OUTPATIENT)
Dept: GASTROENTEROLOGY | Facility: CLINIC | Age: 46
End: 2021-06-14

## 2021-06-14 PROCEDURE — 88305 TISSUE EXAM BY PATHOLOGIST: CPT | Performed by: INTERNAL MEDICINE

## 2021-06-14 PROCEDURE — 45380 COLONOSCOPY AND BIOPSY: CPT | Performed by: INTERNAL MEDICINE

## 2021-06-15 ENCOUNTER — LAB REQUISITION (OUTPATIENT)
Dept: LAB | Facility: HOSPITAL | Age: 46
End: 2021-06-15

## 2021-06-15 DIAGNOSIS — K64.8 OTHER HEMORRHOIDS: ICD-10-CM

## 2021-06-15 DIAGNOSIS — R19.4 CHANGE IN BOWEL HABIT: ICD-10-CM

## 2021-06-15 DIAGNOSIS — K59.00 CONSTIPATION, UNSPECIFIED: ICD-10-CM

## 2021-06-16 LAB
CYTO UR: NORMAL
LAB AP CASE REPORT: NORMAL
LAB AP CLINICAL INFORMATION: NORMAL
PATH REPORT.FINAL DX SPEC: NORMAL
PATH REPORT.GROSS SPEC: NORMAL

## 2021-06-18 ENCOUNTER — TELEPHONE (OUTPATIENT)
Dept: GASTROENTEROLOGY | Facility: CLINIC | Age: 46
End: 2021-06-18

## 2021-06-18 DIAGNOSIS — K59.09 OTHER CONSTIPATION: Primary | ICD-10-CM

## 2021-06-18 DIAGNOSIS — R14.0 BLOATING: ICD-10-CM

## 2021-06-18 DIAGNOSIS — R10.84 GENERALIZED ABDOMINAL PAIN: ICD-10-CM

## 2021-06-18 RX ORDER — PRUCALOPRIDE 2 MG/1
2 TABLET, FILM COATED ORAL DAILY
Qty: 30 TABLET | Refills: 1 | Status: SHIPPED | OUTPATIENT
Start: 2021-06-18

## 2021-07-08 ENCOUNTER — HOSPITAL ENCOUNTER (OUTPATIENT)
Dept: CT IMAGING | Facility: HOSPITAL | Age: 46
Discharge: HOME | End: 2021-07-08
Admitting: INTERNAL MEDICINE

## 2021-07-08 DIAGNOSIS — K59.09 OTHER CONSTIPATION: ICD-10-CM

## 2021-07-08 DIAGNOSIS — R14.0 BLOATING: ICD-10-CM

## 2021-07-08 DIAGNOSIS — R10.84 GENERALIZED ABDOMINAL PAIN: ICD-10-CM

## 2021-07-08 PROCEDURE — 74178 CT ABD&PLV WO CNTR FLWD CNTR: CPT

## 2021-07-08 PROCEDURE — 25010000002 IOPAMIDOL 61 % SOLUTION: Performed by: INTERNAL MEDICINE

## 2021-07-08 RX ADMIN — IOPAMIDOL 85 ML: 612 INJECTION, SOLUTION INTRAVENOUS at 13:29

## 2021-07-13 ENCOUNTER — TELEPHONE (OUTPATIENT)
Dept: GASTROENTEROLOGY | Facility: CLINIC | Age: 46
End: 2021-07-13

## 2021-07-13 NOTE — TELEPHONE ENCOUNTER
Dr Eller,  I spoke with Ms Puentes. CT Scan results given. Patient is complains of constipation issues. She has changed diet to more whole foods; now experiencing diarrhea. Please advise. Thanks

## 2021-07-13 NOTE — TELEPHONE ENCOUNTER
----- Message from Russel Eller MD sent at 7/12/2021  5:31 PM EDT -----  Let Ms. Puentes know the CT scan was normal.

## 2021-07-14 ENCOUNTER — TELEPHONE (OUTPATIENT)
Dept: GASTROENTEROLOGY | Facility: CLINIC | Age: 46
End: 2021-07-14

## 2021-07-14 NOTE — TELEPHONE ENCOUNTER
I spoke with MsYael SanchezDeloris on the phone this afternoon.  She is having 1 somewhat loose stool every other day.  I stated at this time will follow-up for the next couple weeks.  I stated we can certainly use a 14-day course of Xifaxan if needed.

## 2021-11-30 ENCOUNTER — PATIENT MESSAGE (OUTPATIENT)
Dept: GASTROENTEROLOGY | Facility: CLINIC | Age: 46
End: 2021-11-30

## 2021-11-30 NOTE — TELEPHONE ENCOUNTER
From: SOCORRO RITCHIE  To: Yary Puentes  Sent: 11/30/2021 1:07 PM EST  Subject: OVERDUE CMP ORDER    Ms Puentes,  You have an overdue CMP lab ordered by Dr Eller in 8/11/2021. Please get this lab drawn to help with further treatment plan.

## 2021-12-01 ENCOUNTER — TELEPHONE (OUTPATIENT)
Dept: GASTROENTEROLOGY | Facility: CLINIC | Age: 46
End: 2021-12-01

## 2021-12-01 DIAGNOSIS — K90.0 CELIAC SPRUE: Primary | ICD-10-CM

## 2021-12-01 NOTE — TELEPHONE ENCOUNTER
I called and spoke with Mrs. Puentes regarding the question she had about celiac sprue. I discussed the pathology findings of the small bowel biopsy as well as the celiac panel in 2018. The plan at this time will be to repeat a celiac panel and CMP.

## 2021-12-03 ENCOUNTER — LAB (OUTPATIENT)
Dept: LAB | Facility: HOSPITAL | Age: 46
End: 2021-12-03

## 2021-12-03 DIAGNOSIS — K90.0 CELIAC SPRUE: ICD-10-CM

## 2021-12-03 PROCEDURE — 80053 COMPREHEN METABOLIC PANEL: CPT

## 2021-12-03 PROCEDURE — 82784 ASSAY IGA/IGD/IGG/IGM EACH: CPT

## 2021-12-03 PROCEDURE — 83516 IMMUNOASSAY NONANTIBODY: CPT

## 2021-12-04 LAB
ALBUMIN SERPL-MCNC: 4.7 G/DL (ref 3.5–5.2)
ALBUMIN/GLOB SERPL: 1.6 G/DL
ALP SERPL-CCNC: 60 U/L (ref 39–117)
ALT SERPL W P-5'-P-CCNC: 25 U/L (ref 1–33)
ANION GAP SERPL CALCULATED.3IONS-SCNC: 6.7 MMOL/L (ref 5–15)
AST SERPL-CCNC: 25 U/L (ref 1–32)
BILIRUB SERPL-MCNC: 0.4 MG/DL (ref 0–1.2)
BUN SERPL-MCNC: 7 MG/DL (ref 6–20)
BUN/CREAT SERPL: 10.8 (ref 7–25)
CALCIUM SPEC-SCNC: 9.8 MG/DL (ref 8.6–10.5)
CHLORIDE SERPL-SCNC: 104 MMOL/L (ref 98–107)
CO2 SERPL-SCNC: 29.3 MMOL/L (ref 22–29)
CREAT SERPL-MCNC: 0.65 MG/DL (ref 0.57–1)
GFR SERPL CREATININE-BSD FRML MDRD: 99 ML/MIN/1.73
GLOBULIN UR ELPH-MCNC: 2.9 GM/DL
GLUCOSE SERPL-MCNC: 77 MG/DL (ref 65–99)
POTASSIUM SERPL-SCNC: 4.1 MMOL/L (ref 3.5–5.2)
PROT SERPL-MCNC: 7.6 G/DL (ref 6–8.5)
SODIUM SERPL-SCNC: 140 MMOL/L (ref 136–145)

## 2021-12-06 ENCOUNTER — TELEPHONE (OUTPATIENT)
Dept: GASTROENTEROLOGY | Facility: CLINIC | Age: 46
End: 2021-12-06

## 2021-12-06 LAB
GLIADIN PEPTIDE IGA SER-ACNC: 2 UNITS (ref 0–19)
GLIADIN PEPTIDE IGG SER-ACNC: 47 UNITS (ref 0–19)
IGA SERPL-MCNC: <5 MG/DL (ref 87–352)
TTG IGA SER-ACNC: <2 U/ML (ref 0–3)
TTG IGG SER-ACNC: 55 U/ML (ref 0–5)

## 2021-12-06 NOTE — TELEPHONE ENCOUNTER
I called and spoke with Ms. Sanchezett regarding the celiac panel.  There is marked improvement in the tissue transglutaminase IgA level.  Her main complaint is some issues with constipation.  She denies any renee bloating.  I recommend a repeat upper endoscopy and small bowel biopsy to check if there is significant histologic improvement in addition to the serology improvement that is seen on the blood test.  Also will evaluate for any other small bowel abnormality.

## 2021-12-07 ENCOUNTER — TELEPHONE (OUTPATIENT)
Dept: GASTROENTEROLOGY | Facility: CLINIC | Age: 46
End: 2021-12-07

## 2021-12-07 NOTE — TELEPHONE ENCOUNTER
Russel Eller MD Arnett, Amanda, RegSched Rep  Schedule for EGD and small bowel biopsy.  Patient with history of celiac sprue.  Schedule this procedure before the end of the year.

## 2021-12-22 ENCOUNTER — OUTSIDE FACILITY SERVICE (OUTPATIENT)
Dept: GASTROENTEROLOGY | Facility: CLINIC | Age: 46
End: 2021-12-22

## 2021-12-22 PROCEDURE — 88305 TISSUE EXAM BY PATHOLOGIST: CPT | Performed by: INTERNAL MEDICINE

## 2021-12-22 PROCEDURE — 43239 EGD BIOPSY SINGLE/MULTIPLE: CPT | Performed by: INTERNAL MEDICINE

## 2021-12-23 ENCOUNTER — LAB REQUISITION (OUTPATIENT)
Dept: LAB | Facility: HOSPITAL | Age: 46
End: 2021-12-23

## 2021-12-23 DIAGNOSIS — K90.0 CELIAC DISEASE: ICD-10-CM

## 2022-01-04 ENCOUNTER — TELEPHONE (OUTPATIENT)
Dept: GASTROENTEROLOGY | Facility: CLINIC | Age: 47
End: 2022-01-04

## 2022-01-04 NOTE — TELEPHONE ENCOUNTER
----- Message from Russel Eller MD sent at 1/3/2022  3:38 PM EST -----  Let Ms. Puentes know the small bowel biopsies are normal at this time.  There is significant improvement from the time of the initial diagnosis of celiac disease.

## 2023-06-13 ENCOUNTER — TELEPHONE (OUTPATIENT)
Dept: GASTROENTEROLOGY | Facility: CLINIC | Age: 48
End: 2023-06-13
Payer: COMMERCIAL

## 2023-06-13 DIAGNOSIS — K59.09 OTHER CONSTIPATION: Primary | ICD-10-CM

## 2023-06-13 DIAGNOSIS — M62.89 PELVIC FLOOR DYSFUNCTION IN FEMALE: ICD-10-CM

## 2023-06-13 RX ORDER — PLECANATIDE 3 MG/1
3 TABLET ORAL DAILY
Qty: 30 TABLET | Refills: 1 | Status: SHIPPED | OUTPATIENT
Start: 2023-06-13

## 2023-06-13 NOTE — TELEPHONE ENCOUNTER
Called and spoke to Ms. Puentes this afternoon.  She has taken Motegrity in the past but not on a regular basis.  The patient noted some relief the other day with enemas and suppositories.  She has a sense of straining and incomplete evacuation.  I recommend that the anorectal manometry with balloon expulsion be performed as [facility] to evaluate for pelvic floor dyssynergia.  In the interim, will give the patient a trial of Trulance daily.

## 2023-06-14 ENCOUNTER — PRIOR AUTHORIZATION (OUTPATIENT)
Dept: GASTROENTEROLOGY | Facility: CLINIC | Age: 48
End: 2023-06-14
Payer: COMMERCIAL

## 2023-09-06 ENCOUNTER — TREATMENT (OUTPATIENT)
Dept: PHYSICAL THERAPY | Facility: CLINIC | Age: 48
End: 2023-09-06
Payer: COMMERCIAL

## 2023-09-06 DIAGNOSIS — K59.02 CONSTIPATION DUE TO OUTLET DYSFUNCTION: Primary | ICD-10-CM

## 2023-09-06 DIAGNOSIS — M62.89 PELVIC FLOOR DYSFUNCTION: ICD-10-CM

## 2023-09-06 NOTE — PROGRESS NOTES
Physical Therapy Initial Evaluation and Plan of Care  [address]; [place] [address]      Patient: Yary Puentes   : 1975  Diagnosis/ICD-10 Code:  Constipation due to outlet dysfunction [K59.02]  Referring practitioner: Russel Eller MD  Date of Initial Visit: 2023  Today's Date: 2023  Patient seen for 1 sessions    Subjective Celiac disease, battled constipation all my life.  If not focused on lots of water, then constipated.  Don't use laxatives, hurt stomach; don't help  Builds up and lots of vinicius/balls and then explosive diarrhea.  Then pressure relief.  Regular cycle; usually 1BM per week;  GI dr ordered anorectal manometry..  Colonoscopy are good.   Pain in r lower abd pain with sex   Leakage with sneeze  Vaginal US normal  Enemas     Gluten free diet, turkey sandwich, brocolli     Chief Complaint:   Chief Complaint   Patient presents with    Initial Evaluation      Functional Outcome Measure: PFDI-20 score: 45 %    Pain  Tolerable pain  5-6/10 in lower abd association with constipated      Bladder function:    Incontinence: only rare about 1x per month  # of pads in 24 hours: no   How soaked is pad when changed: no  What specifically makes you leak: stepping and caught off guard, other  Leak at night: no  Urination at night: no  Use bathroom “just in case”: yes  Strong urinary urge: no  Leaking with urge: no  Urge triggers: no  Complete bladder voiding %: yes  Post-micturition dribble: yes  Frequency of urination: every 2 hours  Discomfort with urination: not usually  Fluid irritants consumed daily: 12 oz coffee in AM: 5-6 16 oz bottles water; sprite zero 2 per day    Bowel function:    How many episodes of leakage/month: no  How many BM's/day: every week,   [place] Stool Scale Type: 1-6; usually vinicius then explosive diarrhea   Discomfort with BM: yes with straining, some bleeding  Complete bowel voiding %: usually not until explosive diarrhea  Assistance to  pass stool: no  Diet: fiber  Incontinence with gas: no  Ability to pass gas: yes    Sexual activity  Sexually active: yes, sometimes discomfort; tension in lower abd  Pain after sex: achy  Lubrication: [place];    Hx of sexual trauma: no      Diagnostics:    PMH:   Past Medical History:   Diagnosis Date    Celiac disease     Disease of thyroid gland     Elevated liver enzymes     Irritant contact dermatitis     Positive ZAINA (antinuclear antibody)     Sarcoidosis     Telangiectasia         Surgical HX:   Past Surgical History:   Procedure Laterality Date    BILATERAL BREAST REDUCTION      CHOLECYSTECTOMY      UPPER GASTROINTESTINAL ENDOSCOPY          Menstrual cycle: mirena; no periods    Birth Hx: no pregnancies    Meds:   Current Outpatient Medications:     Cholecalciferol (VITAMIN D PO), Take  by mouth Daily., Disp: , Rfl:     citalopram (CeleXA) 20 MG tablet, Take 20 mg by mouth Daily., Disp: , Rfl:     Cyanocobalamin (B-12 COMPLIANCE INJECTION IJ), Inject  as directed Every 30 (Thirty) Days., Disp: , Rfl:     Levothyroxine Sodium (SYNTHROID PO), Take 88 mcg by mouth Daily., Disp: , Rfl:     Plecanatide (Trulance) 3 MG tablet, Take 1 tablet by mouth Daily., Disp: 30 tablet, Rfl: 1    Sodium Sulfate-Mag Sulfate-KCl 3025-947-917 MG tablet, Take 1 kit by mouth Take As Directed., Disp: 24 tablet, Rfl: 0     Occupation: ; sitting at desk all day; work from home.        Objective    Patient independently ambulates into clinic.     Verbal consent obtained for internal pelvic exam/treatment; declined need for second person in room  Posture:ant pelvic tilt, increased lumbar lordosis, forward head, rounded shoulders  Bony Landmarks: level  Palpation: no TTP  Squat full  Flexion full  Seated LE MMT: 4+/5    Supine:   Infrasternal Angle: 90  Breathing pattern:  shallow  Abdominals: diaph restriction, rectus abd TrP  NICOLASA:  - FW-umbilicus,   - FW 5cm above   - FW 5cm below  + Tension   - Doming Noted  Iliacus : tension  B  Psoas :  tensionB  PELVIC FLOOR ASSESSMENT  External:    Sensation: Intact internally and externally B to light touch / pressure    Skin quality/Color/Atrophy: WNL   Ischiocavernosus: WNL   Transverse perineal muscle: WNL   Perineal body: tension    Scar:assessment: -   PFC: +   Cough Reflex: -   Prolapse with cough/bear down: +   Decent with Bear down: +     Internal:   Wall Laxity: mild ant vag wall laxity   Internal superficial perineal body:tension   Perineal body mobility: decreased  Tension/Trigger Points:   Levator ani: +   Obturator internus: -   Compressor urethra: + urge    PERF SCALE:  Power: 4  Endurance: 10  Repetitions: 3  Fast twitch: 10  PFM:  Recruitment: good  Derecruitment: slow    See flowsheet for details of treatment following evaluation.    Basic information was provided regarding pelvic floor anatomy, explanation of the function of the pelvic floor, interaction between diaphragm and PFM. Patient was provided with a bladder diary to be completed and returned to next visit. Instruction began regarding fluid intake, micturition and defecation behavior, and use of squatty potty.    Assessment/Plan:     Assessment/Plan    The patient is a 47 y.o. female who presents to physical therapy today for constipation due to pelvic outlet dysfunction, pelvic floor dysfunction. Upon initial evaluation, the patient demonstrates the following impairments: pelvic floor dysfunction, muscle tension. Due to these impairments, the patient is unable to regular bowel movements, has bloating and abd pain. The patient would benefit from skilled pelvic PT services to address functional limitations and impairments and to improve patient quality of life.      Goals:   STG's: 4 weeks  Patient will report > 50% reduction in overall pain   Patient will report > 50% improvement in urinary/bowel symptoms for improved QOL  Patient will be able to actively and consistently contract PFM 5/5 trials for progress toward  LTG  Patient will be independent with prolapse precautions and pelvic brace with lifting  Patient will be able to perform HEP with minimal verbal cues    LTG's: By discharge  Patient will report a decrease in pain to < 0/10  Patient will report >75% improvement in urinary/bowel symptoms   Patient will report no incontinence x 2-3 weeks for improved QOL  Patient will be able to tolerate an internal pelvic exam/vaginal penetration with pain <0/10   Patient will report improved voiding frequency to once every 3-4 hours for improved function with ADLs.  Patient will be independent with HEP    Plan  Therapy options: will be seen for skilled physical therapy services  Planned modality interventions: TENS, ultrasound, cryotherapy, thermotherapy (hydrocollator packs)  Planned therapy interventions: abdominal trunk stabilization, manual therapy, neuromuscular re-education, body mechanics training, flexibility, functional ROM exercises, gait training, home exercise program, joint mobilization, therapeutic activities, stretching, strengthening, spinal/joint mobilization, soft tissue mobilization and postural training, dry needling.  Pt prognosis: good  Frequency: weekly  Duration in visits: 12  Duration in weeks: 12  Treatment plan discussed with: patient    Treatment Time: 150 - 240  Timed:         Manual Therapy:    15     mins  31759;     Therapeutic Exercise:    0     mins  34531;     Neuromuscular Ilsa:    0    mins  12655;    Therapeutic Activity:     0     mins  63583;     Gait Trainin     mins  21614;     Ultrasound:     0     mins  72294;    Ionto                               0    mins   64487  Self Care                       0     mins   13155  Canalith Repos    0     mins 68333      Un-Timed:  Electrical Stimulation:    0     mins  03484 ( );  Dry Needling     0     mins self-pay  Traction     0     mins 01657  Low Eval     0     Mins  92081  Mod Eval     35     Mins  43729  High Eval                        0     Mins  33209  Re-Eval                           0    mins  22865        Timed Treatment:   15   mins   Total Treatment:     50   mins    PT SIGNATURE:Breonna Neal, PT  [place] License: [id number]    DATE TREATMENT INITIATED: 9/6/2023    Initial Certification  Certification Period: 12/5/2023  I certify that the therapy services are furnished while this patient is under my care.  The services outlined above are required by this patient, and will be reviewed every 90 days.     PHYSICIAN: Russel Eller MD  NPI: [id number]       DATE: 09/06/2023     Please sign and return via fax to [phone]. Thank you, [facility].

## 2025-03-31 NOTE — TELEPHONE ENCOUNTER
I called and discussed the results of pathology with Mrs. Puentes.  She states that it feels as though it is higher up in the abdomen where the issue is with the inability to have a good bowel movement.  She does not feel as though it is in the rectum.  I discussed the possibility of doing an anorectal manometry but given this current history will image with a CT scan with oral contrast of the abdomen.  I also will go ahead and prescribe prucalopride once daily to help give her some relief.  
20

## 2025-06-24 ENCOUNTER — LAB (OUTPATIENT)
Dept: LAB | Facility: HOSPITAL | Age: 50
End: 2025-06-24
Payer: COMMERCIAL

## 2025-06-24 ENCOUNTER — OFFICE VISIT (OUTPATIENT)
Dept: GASTROENTEROLOGY | Facility: CLINIC | Age: 50
End: 2025-06-24
Payer: COMMERCIAL

## 2025-06-24 VITALS
DIASTOLIC BLOOD PRESSURE: 80 MMHG | WEIGHT: 134 LBS | BODY MASS INDEX: 23.74 KG/M2 | HEIGHT: 63 IN | TEMPERATURE: 97.8 F | OXYGEN SATURATION: 99 % | SYSTOLIC BLOOD PRESSURE: 112 MMHG | HEART RATE: 63 BPM

## 2025-06-24 DIAGNOSIS — K58.1 IRRITABLE BOWEL SYNDROME WITH CONSTIPATION: Primary | ICD-10-CM

## 2025-06-24 DIAGNOSIS — K90.0 CELIAC DISEASE: ICD-10-CM

## 2025-06-24 DIAGNOSIS — M62.89 PELVIC FLOOR DYSFUNCTION IN FEMALE: ICD-10-CM

## 2025-06-24 PROBLEM — E03.9 HYPOTHYROIDISM: Status: ACTIVE | Noted: 2025-06-24

## 2025-06-24 PROCEDURE — 86364 TISS TRNSGLTMNASE EA IG CLAS: CPT

## 2025-06-24 PROCEDURE — 82784 ASSAY IGA/IGD/IGG/IGM EACH: CPT

## 2025-06-24 PROCEDURE — 86231 EMA EACH IG CLASS: CPT

## 2025-06-24 PROCEDURE — 99204 OFFICE O/P NEW MOD 45 MIN: CPT | Performed by: NURSE PRACTITIONER

## 2025-06-24 RX ORDER — TENAPANOR HYDROCHLORIDE 53.2 MG/1
50 TABLET ORAL 2 TIMES DAILY
Qty: 60 TABLET | Refills: 11 | Status: SHIPPED | OUTPATIENT
Start: 2025-06-24

## 2025-06-24 NOTE — PROGRESS NOTES
Follow Up      Date: 2025   Patient Name: Yary Puentes  : 1975   MRN: [id number]     Chief Complaint:    Chief Complaint   Patient presents with    Constipation       History of Present Illness: Yary Puentes is a 49 y.o. female who is here today for follow up on constipation, in the setting of Celiac Disease. Symptoms worsened int he last few months, but there are no obvious causes for this. Has been watching her diet and has tried to increased fiber and water consumption, but continues to have hard, infrequent stools (BSFS type 1). Can experience abdominal pain and bloating. She has tried and failed several therapies including Miralax, Correctol, Ex lax, Magnesium and Linzess. Stimulant laxatives caused cramping and bloating. Previously taking Motegrity, and this did help some, but she was not emptying completely and flet sluggish. Was able to finally have a BM after drinking [place] oil on Friday. Has not had a BM since. Previously went to pelvic floor PT and did not fell like it was beneficial.     Celiac disease dx in 2018 via labs and small bowel biopsy. Duodenal biopsy was normal on repeat small bowel biopsy. She eats a gluten free diet. Can experience sharp pain in her head and vomits with gluten exposure. She uses separate pots to cook with and a separate air fryer. Rarely goes out to eat.     Has hypothyroidism, which has been stable for years. Will have her TSH and labs checked tomorrow at a health fair.     Anorectal Manometry (2023) revealed features of pelvic floor dyssynergia.  Recommended considering pelvic floor physical therapy.  COLONOSCOPY (2021) for change in bowel habits/constipation, per Dr. Eller: Internal hemorrhoids.  Otherwise, normal entire colonoscopy and TI.    Subjective      Review of Systems:   Review of Systems   Constitutional: Negative.    HENT: Negative.     Eyes: Negative.    Respiratory: Negative.     Cardiovascular: Negative.   "  Gastrointestinal:  Positive for abdominal distention (BLOATING), abdominal pain and constipation.   Endocrine: Negative.    Genitourinary: Negative.    Musculoskeletal: Negative.    Skin: Negative.    Allergic/Immunologic: Negative.    Neurological: Negative.    Hematological: Negative.    Psychiatric/Behavioral: Negative.         I have reviewed the patients family history, social history, past medical history, past surgical history and have updated it as appropriate.     Medications:     Current Outpatient Medications:     Cholecalciferol (VITAMIN D PO), Take  by mouth Daily., Disp: , Rfl:     citalopram (CeleXA) 20 MG tablet, Take 2 tablets by mouth Daily., Disp: , Rfl:     Cyanocobalamin (B-12 COMPLIANCE INJECTION IJ), Inject  as directed Every 30 (Thirty) Days., Disp: , Rfl:     Levothyroxine Sodium (SYNTHROID PO), Take 88 mcg by mouth Daily., Disp: , Rfl:     Tenapanor HCl (Ibsrela) 50 MG tablet, Take 1 tablet by mouth 2 (Two) Times a Day., Disp: 60 tablet, Rfl: 11    Allergies:   No Known Allergies      Objective     Physical Exam:  Vital Signs:   Vitals:    06/24/25 0810   BP: 112/80   BP Location: Right arm   Patient Position: Sitting   Cuff Size: Adult   Pulse: 63   Temp: 97.8 °F (36.6 °C)   TempSrc: Temporal   SpO2: 99%   Weight: 60.8 kg (134 lb)   Height: 160 cm (63\")   PainSc: 6    PainLoc: Abdomen     Body mass index is 23.74 kg/m².     Metrics:   Yary Puentes  reports that she has been smoking cigarettes. She has a 2.5 pack-year smoking history. She does not have any smokeless tobacco history on file.      Colonoscopy/Colon Cancer Screening:   COLONOSCOPY (06/14/2021) for change in bowel habits/constipation, per Dr. Eller: Internal hemorrhoids.  Otherwise, normal entire colonoscopy and TI.    Physical Exam  Vitals and nursing note reviewed.   Constitutional:       General: She is not in acute distress.     Appearance: Normal appearance. She is well-developed.   HENT:      Head: " Normocephalic and atraumatic.      Right Ear: External ear normal.      Left Ear: External ear normal.      Nose: Nose normal.      Mouth/Throat:      Mouth: Mucous membranes are moist.   Eyes:      Extraocular Movements: Extraocular movements intact.      Conjunctiva/sclera: Conjunctivae normal.      Pupils: Pupils are equal, round, and reactive to light.   Cardiovascular:      Rate and Rhythm: Normal rate and regular rhythm.      Heart sounds: Normal heart sounds.   Pulmonary:      Effort: Pulmonary effort is normal.      Breath sounds: Normal breath sounds.   Abdominal:      General: Abdomen is flat. Bowel sounds are normal. There is no distension.      Palpations: Abdomen is soft.      Tenderness: There is no abdominal tenderness.      Hernia: No hernia is present.   Musculoskeletal:         General: Normal range of motion.      Cervical back: Normal range of motion.   Skin:     General: Skin is warm and dry.   Neurological:      General: No focal deficit present.      Mental Status: She is alert and oriented to person, place, and time.   Psychiatric:         Attention and Perception: Attention normal.         Mood and Affect: Mood and affect normal.         Speech: Speech normal.         Behavior: Behavior normal. Behavior is cooperative.         Thought Content: Thought content normal.         Cognition and Memory: Cognition normal.         Judgment: Judgment normal.         Assessment / Plan      Assessment/Plan:   Diagnoses and all orders for this visit:    1. Pelvic floor dysfunction in female (Primary)   - previously underwent pelvic floor pt and did not find it helpful    2. Irritable bowel syndrome with constipation  -     Start Tenapanor HCl (Ibsrela) 50 MG tablet; Take 1 tablet by mouth 2 (Two) Times a Day.  Dispense: 60 tablet; Refill: 11    3. Celiac disease  -     Celiac Disease Panel; Future       She has continued/worsening symptoms of constipation and abdominal pain/distention, despite dietary  modifications and increased water consumption. Start Ibsrela. Hold Motegrity. If symptoms not improved, will consider adding Motegrity to Ibsrela.     Will check Celiac panel to evaluate for unknown gluten exposure. Will have TSH checked tomorrow.     Follow Up:   Return in about 8 weeks (around 8/19/2025).    Plan of care reviewed with the patient at the conclusion of today's visit.  Education was provided regarding diagnosis, management, and any prescribed or recommended OTC medications.  Patient verbalized understanding of and agreement with management plan.     NOTE TO PATIENT: The 21st Century Cures Act makes medical notes like these available to patients in the interest of transparency. However, be advised this is a medical document. It is intended as peer to peer communication. It is written in medical language and may contain abbreviations or verbiage that are unfamiliar. It may appear blunt or direct. Medical documents are intended to carry relevant information, facts as evident, and the clinical opinion of the practitioner.     FLORENCE Davidson  [facility]

## 2025-06-24 NOTE — PATIENT INSTRUCTIONS
If you smoke, vape or use tobacco please read the following:   Smoking increases the risk of heart disease, lung disease, vascular disease, stroke and cancer. If you smoke, I recommend stopping.   For more information:  Quit Now [place]  1-800-quit-now  [email or web address]-[email or web address]

## 2025-06-26 LAB
ENDOMYSIUM IGA SER QL: NEGATIVE
IGA SERPL-MCNC: <5 MG/DL (ref 87–352)
TTG IGA SER-ACNC: <2 U/ML (ref 0–3)
TTG IGG SER-ACNC: 26 U/ML (ref 0–5)

## 2025-07-02 ENCOUNTER — TELEPHONE (OUTPATIENT)
Dept: GASTROENTEROLOGY | Facility: CLINIC | Age: 50
End: 2025-07-02
Payer: COMMERCIAL

## 2025-07-02 NOTE — TELEPHONE ENCOUNTER
Hub staff attempted to follow warm transfer process and was unsuccessful     Caller: CHAIM GUADALUPE    Relationship to patient: SELF     Best call back number: [phone]    Patient is needing:PT SAW BALDEMAR BARROSO LAST WEEK, PRESCRIBED RX Tenapanor HCl (Ibsrela) 50 MG tablet, PHARMACY REACHED OUT TO PT SAYING WE NEED TO PROVIDE INFO ABOUT APPROVAL TO HEALTH INSURANCE, WE NEED TO SUBMIT HEALTH RECORDS TO PT'S INSURANCE [facility] - [facility] [place] - [address]  [address] - [phone]  - [phone]   [address]  [address] [address]  Phone: [phone]  Fax: [phone]

## 2025-07-08 ENCOUNTER — PRIOR AUTHORIZATION (OUTPATIENT)
Dept: GASTROENTEROLOGY | Facility: CLINIC | Age: 50
End: 2025-07-08
Payer: COMMERCIAL

## 2025-07-08 ENCOUNTER — RESULTS FOLLOW-UP (OUTPATIENT)
Dept: GASTROENTEROLOGY | Facility: CLINIC | Age: 50
End: 2025-07-08
Payer: COMMERCIAL

## 2025-07-08 ENCOUNTER — TELEPHONE (OUTPATIENT)
Dept: GASTROENTEROLOGY | Facility: CLINIC | Age: 50
End: 2025-07-08
Payer: COMMERCIAL

## 2025-07-08 NOTE — LETTER
Yary R Deloris  [address] [place] [address]    July 8, 2025     Dear Ms. Puentes:    Below are the results from your recent visit:    Resulted Orders   Celiac Disease Panel   Result Value Ref Range    Endomysial IgA Negative Negative    Tissue Transglutaminase IgA <2 0 - 3 U/mL      Comment:                                    Negative        0 -  3                                Weak Positive   4 - 10                                Positive           >10   Tissue Transglutaminase (tTG) has been identified   as the endomysial antigen.  Studies have demonstr-   ated that endomysial IgA antibodies have over 99%   specificity for gluten sensitive enteropathy.    IgA <5 (L) 87 - 352 mg/dL      Comment:      Result confirmed on concentration.   Tissue Transglutaminase, IgG   Result Value Ref Range    Tissue Transglutaminase IgG 26 (H) 0 - 5 U/mL      Comment:                                    Negative        0 - 5                                Weak Positive   6 - 9                                Positive           >9       As previously discussed, your TTG IGG level is elevated, which could indicate continued gluten expose. However, it is trending down nicely and some never have normalization of TTG IGG. EGD with small bowel biopsy would be the definitive test to see if you are getting exposed to gluten. Your duodenal biopsies were normal 3 years ago on EGD.   Tissue Transglutaminase, IgG; Celiac Disease Panel    If you have any questions or concerns, please don't hesitate to call.         Sincerely,        FLORENCE Martin

## 2025-07-08 NOTE — TELEPHONE ENCOUNTER
Caller: Yary Puentes    Relationship to patient: Self      Best call back number: [phone]     Provider: BLACK     Medication PA needed: IBSRELA     Reason for call/Prior Auth:   PATIENT STATES THAT SHE STILL HAS NOT BEEN ABLE TO GET HER MEDICATION AND IS WANTING TO KNOW THE STATUS OF THE PA.     SHE ONLY HAS 1 PILL LEFT.

## 2025-07-08 NOTE — TELEPHONE ENCOUNTER
CHAIM GUADALUPE (Key: [id number])  PA Case ID #: [id number]  Need Help? Call us at [phone]  Status  sent iconSent to Plan today  Drug  Ibsrela 50MG tablets  ePA cloud logo  Form  [employer] Electronic PA Form (2017 NCPDP)

## 2025-07-14 ENCOUNTER — PATIENT MESSAGE (OUTPATIENT)
Dept: GASTROENTEROLOGY | Facility: CLINIC | Age: 50
End: 2025-07-14
Payer: COMMERCIAL

## 2025-07-15 DIAGNOSIS — K59.04 CHRONIC IDIOPATHIC CONSTIPATION: Primary | ICD-10-CM

## 2025-07-15 RX ORDER — LUBIPROSTONE 24 UG/1
24 CAPSULE ORAL 2 TIMES DAILY WITH MEALS
Qty: 180 CAPSULE | Refills: 3 | Status: SHIPPED | OUTPATIENT
Start: 2025-07-15

## 2025-08-18 ENCOUNTER — PRIOR AUTHORIZATION (OUTPATIENT)
Dept: GASTROENTEROLOGY | Facility: CLINIC | Age: 50
End: 2025-08-18
Payer: COMMERCIAL

## 2025-08-18 DIAGNOSIS — K58.1 IRRITABLE BOWEL SYNDROME WITH CONSTIPATION: Primary | ICD-10-CM

## 2025-08-18 RX ORDER — TENAPANOR HYDROCHLORIDE 53.2 MG/1
50 TABLET ORAL 2 TIMES DAILY
Qty: 60 TABLET | Refills: 11 | Status: SHIPPED | OUTPATIENT
Start: 2025-08-18